# Patient Record
Sex: MALE | Race: BLACK OR AFRICAN AMERICAN | Employment: OTHER | URBAN - NONMETROPOLITAN AREA
[De-identification: names, ages, dates, MRNs, and addresses within clinical notes are randomized per-mention and may not be internally consistent; named-entity substitution may affect disease eponyms.]

---

## 2015-12-15 LAB — PROSTATE SPECIFIC ANTIGEN: 3.26 NG/ML

## 2017-04-17 LAB — PROSTATE SPECIFIC ANTIGEN: 2.73 NG/ML

## 2018-02-01 LAB
ALBUMIN SERPL-MCNC: 4.1 G/DL
ALP BLD-CCNC: 44 U/L
ALT SERPL-CCNC: 15 U/L
ANION GAP SERPL CALCULATED.3IONS-SCNC: 8 MMOL/L
AST SERPL-CCNC: 22 U/L
BILIRUB SERPL-MCNC: 0.8 MG/DL (ref 0.1–1.4)
BUN BLDV-MCNC: 30 MG/DL
CALCIUM SERPL-MCNC: 9 MG/DL
CHLORIDE BLD-SCNC: 112 MMOL/L
CHOLESTEROL, TOTAL: 134 MG/DL
CHOLESTEROL/HDL RATIO: 2.1
CO2: 23 MMOL/L
CREAT SERPL-MCNC: 1.53 MG/DL
GFR CALCULATED: NORMAL
GLUCOSE BLD-MCNC: 96 MG/DL
HDLC SERPL-MCNC: 62 MG/DL (ref 35–70)
LDL CHOLESTEROL CALCULATED: 88 MG/DL (ref 0–160)
POTASSIUM SERPL-SCNC: 4.2 MMOL/L
PROSTATE SPECIFIC ANTIGEN: 4.19 NG/ML
SODIUM BLD-SCNC: 143 MMOL/L
TOTAL PROTEIN: 6.4
TRIGL SERPL-MCNC: 56 MG/DL
VLDLC SERPL CALC-MCNC: 11 MG/DL

## 2018-06-20 ENCOUNTER — OFFICE VISIT (OUTPATIENT)
Dept: UROLOGY | Age: 83
End: 2018-06-20
Payer: MEDICARE

## 2018-06-20 VITALS
DIASTOLIC BLOOD PRESSURE: 62 MMHG | SYSTOLIC BLOOD PRESSURE: 132 MMHG | WEIGHT: 306.6 LBS | BODY MASS INDEX: 45.41 KG/M2 | HEIGHT: 69 IN

## 2018-06-20 DIAGNOSIS — R97.20 ELEVATED PSA: Primary | ICD-10-CM

## 2018-06-20 LAB
BILIRUBIN URINE: NEGATIVE
BLOOD URINE, POC: NEGATIVE
CHARACTER, URINE: CLEAR
COLOR, URINE: YELLOW
GLUCOSE URINE: NEGATIVE MG/DL
KETONES, URINE: NEGATIVE
LEUKOCYTE CLUMPS, URINE: ABNORMAL
NITRITE, URINE: NEGATIVE
PH, URINE: 5.5
PROTEIN, URINE: NEGATIVE MG/DL
SPECIFIC GRAVITY, URINE: 1.01 (ref 1–1.03)
UROBILINOGEN, URINE: 0.2 EU/DL

## 2018-06-20 PROCEDURE — 81003 URINALYSIS AUTO W/O SCOPE: CPT | Performed by: NURSE PRACTITIONER

## 2018-06-20 PROCEDURE — G8427 DOCREV CUR MEDS BY ELIG CLIN: HCPCS | Performed by: NURSE PRACTITIONER

## 2018-06-20 PROCEDURE — G8417 CALC BMI ABV UP PARAM F/U: HCPCS | Performed by: NURSE PRACTITIONER

## 2018-06-20 PROCEDURE — 1036F TOBACCO NON-USER: CPT | Performed by: NURSE PRACTITIONER

## 2018-06-20 PROCEDURE — 1123F ACP DISCUSS/DSCN MKR DOCD: CPT | Performed by: NURSE PRACTITIONER

## 2018-06-20 PROCEDURE — 4040F PNEUMOC VAC/ADMIN/RCVD: CPT | Performed by: NURSE PRACTITIONER

## 2018-06-20 PROCEDURE — 99203 OFFICE O/P NEW LOW 30 MIN: CPT | Performed by: NURSE PRACTITIONER

## 2018-06-20 RX ORDER — INSULIN GLARGINE 100 [IU]/ML
INJECTION, SOLUTION SUBCUTANEOUS NIGHTLY
COMMUNITY

## 2018-06-20 RX ORDER — POTASSIUM CHLORIDE 750 MG/1
10 CAPSULE, EXTENDED RELEASE ORAL DAILY
COMMUNITY

## 2018-06-20 RX ORDER — HYDRALAZINE HYDROCHLORIDE 25 MG/1
25 TABLET, FILM COATED ORAL 2 TIMES DAILY
COMMUNITY

## 2018-06-20 RX ORDER — TRAZODONE HYDROCHLORIDE 50 MG/1
50 TABLET ORAL NIGHTLY
COMMUNITY

## 2018-06-20 RX ORDER — ESOMEPRAZOLE MAGNESIUM 40 MG/1
40 FOR SUSPENSION ORAL DAILY
COMMUNITY
End: 2021-11-01

## 2018-06-20 RX ORDER — NIACIN 1000 MG
TABLET, EXTENDED RELEASE ORAL
COMMUNITY

## 2018-06-20 RX ORDER — NEBIVOLOL 10 MG/1
10 TABLET ORAL DAILY
COMMUNITY

## 2018-06-20 RX ORDER — PIOGLITAZONEHYDROCHLORIDE 45 MG/1
45 TABLET ORAL DAILY
COMMUNITY
End: 2021-11-01

## 2018-06-20 RX ORDER — LISINOPRIL 20 MG/1
20 TABLET ORAL 2 TIMES DAILY
COMMUNITY

## 2018-06-20 RX ORDER — SIMVASTATIN 40 MG
40 TABLET ORAL NIGHTLY
COMMUNITY

## 2018-06-20 RX ORDER — CHLORTHALIDONE 25 MG/1
25 TABLET ORAL DAILY
COMMUNITY

## 2018-08-15 LAB — PROSTATE SPECIFIC ANTIGEN: 4.88 NG/ML

## 2018-08-22 ENCOUNTER — OFFICE VISIT (OUTPATIENT)
Dept: UROLOGY | Age: 83
End: 2018-08-22
Payer: MEDICARE

## 2018-08-22 VITALS
SYSTOLIC BLOOD PRESSURE: 138 MMHG | DIASTOLIC BLOOD PRESSURE: 68 MMHG | WEIGHT: 302 LBS | BODY MASS INDEX: 45.77 KG/M2 | HEIGHT: 68 IN

## 2018-08-22 DIAGNOSIS — R97.20 ELEVATED PSA: Primary | ICD-10-CM

## 2018-08-22 LAB
BILIRUBIN URINE: NEGATIVE
BLOOD URINE, POC: NEGATIVE
CHARACTER, URINE: CLEAR
COLOR, URINE: YELLOW
GLUCOSE URINE: NEGATIVE MG/DL
KETONES, URINE: NEGATIVE
LEUKOCYTE CLUMPS, URINE: ABNORMAL
NITRITE, URINE: NEGATIVE
PH, URINE: 5.5
PROTEIN, URINE: NEGATIVE MG/DL
SPECIFIC GRAVITY, URINE: 1.02 (ref 1–1.03)
UROBILINOGEN, URINE: 0.2 EU/DL

## 2018-08-22 PROCEDURE — 1101F PT FALLS ASSESS-DOCD LE1/YR: CPT | Performed by: NURSE PRACTITIONER

## 2018-08-22 PROCEDURE — G8427 DOCREV CUR MEDS BY ELIG CLIN: HCPCS | Performed by: NURSE PRACTITIONER

## 2018-08-22 PROCEDURE — 1036F TOBACCO NON-USER: CPT | Performed by: NURSE PRACTITIONER

## 2018-08-22 PROCEDURE — 81003 URINALYSIS AUTO W/O SCOPE: CPT | Performed by: NURSE PRACTITIONER

## 2018-08-22 PROCEDURE — 99214 OFFICE O/P EST MOD 30 MIN: CPT | Performed by: NURSE PRACTITIONER

## 2018-08-22 PROCEDURE — 1123F ACP DISCUSS/DSCN MKR DOCD: CPT | Performed by: NURSE PRACTITIONER

## 2018-08-22 PROCEDURE — G8417 CALC BMI ABV UP PARAM F/U: HCPCS | Performed by: NURSE PRACTITIONER

## 2018-08-22 PROCEDURE — 4040F PNEUMOC VAC/ADMIN/RCVD: CPT | Performed by: NURSE PRACTITIONER

## 2018-08-30 ENCOUNTER — TELEPHONE (OUTPATIENT)
Dept: UROLOGY | Age: 83
End: 2018-08-30

## 2018-08-30 ENCOUNTER — HOSPITAL ENCOUNTER (OUTPATIENT)
Dept: MRI IMAGING | Age: 83
Discharge: HOME | End: 2018-08-30
Payer: MEDICARE

## 2018-08-30 DIAGNOSIS — R97.20 ELEVATED PSA: ICD-10-CM

## 2018-08-30 LAB — POC CREATININE WHOLE BLOOD: 1.4 MG/DL (ref 0.5–1.2)

## 2018-08-30 PROCEDURE — A9579 GAD-BASE MR CONTRAST NOS,1ML: HCPCS | Performed by: NURSE PRACTITIONER

## 2018-08-30 PROCEDURE — 6360000004 HC RX CONTRAST MEDICATION: Performed by: NURSE PRACTITIONER

## 2018-08-30 PROCEDURE — 82565 ASSAY OF CREATININE: CPT

## 2018-08-30 PROCEDURE — 76377 3D RENDER W/INTRP POSTPROCES: CPT

## 2018-08-30 RX ADMIN — GADOTERIDOL 20 ML: 279.3 INJECTION, SOLUTION INTRAVENOUS at 10:28

## 2018-08-30 NOTE — TELEPHONE ENCOUNTER
Nadir Islas agreed tot he [place] prostate biopsy in office with Dr Martinez Navarro on 10/8/2018 2:45pm.  F/U for results 10/22/18 11:45am. Discussed prep. Advised instructions will be mailed. Please send the antibiotics to [facility] in [place].

## 2018-08-30 NOTE — TELEPHONE ENCOUNTER
Please let patient know that MRI showed a few areas which were consistent with PI-RADS 3. We typically biopsy any score 3 lesions or above. I would suggest biopsy of these lesions. If he agrees I will send antibiotics. Multiple heterogeneous and well defined nodules are noted throughout the transitional zone. These are most consistent with nodules of benign prosthetic hypertrophy (PI-RADS 2). A focal area of hypointense T2 signal is also noted within the right    posterior apex of the gland which demonstrates corresponding subtle restricted diffusion. A more ill-defined area of hypointense T2 signal is also noted at the posterior right base of the gland. This also demonstrates subtle restricted diffusion. These    areas are most consistent with PI-RADS 3. Overall, this is considered PI-RADS 3: Intermediate (the presence of clinically significant cancer is equivocal).

## 2018-08-30 NOTE — TELEPHONE ENCOUNTER
Patient has been notified of MRI results, per Alicia Cintron CNP. Patient is willing to have a prostate biopsy. Schedulers, will you please call the patient and get the biopsy scheduled. Thank you.

## 2018-09-05 ENCOUNTER — TELEPHONE (OUTPATIENT)
Dept: UROLOGY | Age: 83
End: 2018-09-05

## 2018-09-05 RX ORDER — GENTAMICIN SULFATE 40 MG/ML
80 INJECTION, SOLUTION INTRAMUSCULAR; INTRAVENOUS ONCE
Qty: 2 ML | Refills: 0 | Status: SHIPPED | OUTPATIENT
Start: 2018-09-05 | End: 2018-09-05

## 2018-09-05 RX ORDER — CIPROFLOXACIN 500 MG/1
500 TABLET, FILM COATED ORAL 2 TIMES DAILY
Qty: 6 TABLET | Refills: 0 | Status: SHIPPED | OUTPATIENT
Start: 2018-09-05 | End: 2018-09-08

## 2018-09-05 NOTE — TELEPHONE ENCOUNTER
Please send the Cipro & Gentamycin to [facility] on [address]. Cee Atwood is scheduled for UroNav on 10/8/2018.     Juan Diego Renteria

## 2018-10-08 ENCOUNTER — PROCEDURE VISIT (OUTPATIENT)
Dept: UROLOGY | Age: 83
End: 2018-10-08
Payer: MEDICARE

## 2018-10-08 VITALS
WEIGHT: 298 LBS | BODY MASS INDEX: 45.16 KG/M2 | HEIGHT: 68 IN | DIASTOLIC BLOOD PRESSURE: 62 MMHG | SYSTOLIC BLOOD PRESSURE: 138 MMHG

## 2018-10-08 DIAGNOSIS — R97.20 ELEVATED PSA: Primary | ICD-10-CM

## 2018-10-08 PROCEDURE — 99999 PR SONO GUIDE NEEDLE BIOPSY: CPT | Performed by: UROLOGY

## 2018-10-08 PROCEDURE — 76872 US TRANSRECTAL: CPT | Performed by: UROLOGY

## 2018-10-08 PROCEDURE — 55700 PR BIOPSY OF PROSTATE,NEEDLE/PUNCH: CPT | Performed by: UROLOGY

## 2018-10-08 PROCEDURE — 99999 PR OFFICE/OUTPT VISIT,PROCEDURE ONLY: CPT | Performed by: UROLOGY

## 2018-10-08 PROCEDURE — 96372 THER/PROPH/DIAG INJ SC/IM: CPT | Performed by: UROLOGY

## 2018-10-08 RX ORDER — GENTAMICIN SULFATE 40 MG/ML
80 INJECTION, SOLUTION INTRAMUSCULAR; INTRAVENOUS ONCE
Status: COMPLETED | OUTPATIENT
Start: 2018-10-08 | End: 2018-10-08

## 2018-10-08 RX ADMIN — GENTAMICIN SULFATE 80 MG: 40 INJECTION, SOLUTION INTRAMUSCULAR; INTRAVENOUS at 16:05

## 2018-10-08 NOTE — PROGRESS NOTES
Following Dr. Trimble [place] of care. Gentamicin 80MG/2ML GIVEN I.M Right UOQ HIP  Lot Number: [id number]  Expiration Date: 02/01/2019  [facility] #: [id number]    Patient supplied their own medications Yes    Procedure: Trus/Biopsy  Pt name and birth date verified Yes  Patient agrees  Is taking biopsies of the prostate Yes  Patient took Enema 2 hours prior to procedure Yes  Patient took 2 pill(s) of Cipro day before procedure, day of, and will the day after Yes  Has patient eaten today?  Yes   Patient stopped all blood thinners prior to surgery N/A      *ADDENDUM: MAR is incorrect, patient supplied own medication

## 2018-10-08 NOTE — PROGRESS NOTES
Mr. Ramonita Prieto was seen in follow up for his prostate biopsy. The biopsy was indicated and is being performed for Elevated PSA. The biopsy is being done with MRI / Ultrasound fusion using the UroNav system. The biopsy was done without difficulty or complication. TRUS prostate biopsy note:  After obtaining informed consent, the rectal ultrasound probe was passed per rectum and the prostate visualized. A local block was performed by instilling 2% lidocaine at the base. Measurements were taken and the prostate measured 6.67 x 5.29 x 5.52 mm for a total volume of 102.17 cc. At this point, prostate biopsy was performed. Using Skylar Ayers, the ultrasound and MRI images were fused and then biopsies of the lesions identified by the radiologist were taken. Three cores were taken from each of the 2 lesions seen on MRI. Two cores were then  taken at the base, the mid-portion, and the apex of the gland on either side for a total of 12 cores. The rectal probe was removed and there was minimal bleeding. Mr. Ramonita Prieto tolerated the procedure well and there were no complications. Prostate size: 102.17 cc  Prostatic calcifications: no   Hypoechoic areas: no   Cores taken: 6 + 3 cores each from 2 lesions making 12 total cores  Complications: none      Assessment:      Prostate biopsy performed without difficulty. This was done with UroNav MRI fused guidance. Plan:        I will see Jaylan Moore back to discuss the pathology in 1-2 weeks. Further recommendations will be based on these results.

## 2018-10-22 ENCOUNTER — OFFICE VISIT (OUTPATIENT)
Dept: UROLOGY | Age: 83
End: 2018-10-22
Payer: MEDICARE

## 2018-10-22 VITALS — HEIGHT: 68 IN | WEIGHT: 298 LBS | BODY MASS INDEX: 45.16 KG/M2

## 2018-10-22 DIAGNOSIS — R97.20 ELEVATED PSA: Primary | ICD-10-CM

## 2018-10-22 DIAGNOSIS — R31.9 URINARY TRACT INFECTION WITH HEMATURIA, SITE UNSPECIFIED: ICD-10-CM

## 2018-10-22 DIAGNOSIS — N39.0 URINARY TRACT INFECTION WITH HEMATURIA, SITE UNSPECIFIED: ICD-10-CM

## 2018-10-22 LAB
BILIRUBIN URINE: NEGATIVE
BLOOD URINE, POC: NEGATIVE
CHARACTER, URINE: CLEAR
COLOR, URINE: YELLOW
GLUCOSE URINE: NEGATIVE MG/DL
KETONES, URINE: NEGATIVE
LEUKOCYTE CLUMPS, URINE: ABNORMAL
NITRITE, URINE: NEGATIVE
PH, URINE: 6
PROTEIN, URINE: NEGATIVE MG/DL
SPECIFIC GRAVITY, URINE: 1.01 (ref 1–1.03)
UROBILINOGEN, URINE: 4 EU/DL

## 2018-10-22 PROCEDURE — G8417 CALC BMI ABV UP PARAM F/U: HCPCS | Performed by: UROLOGY

## 2018-10-22 PROCEDURE — 4040F PNEUMOC VAC/ADMIN/RCVD: CPT | Performed by: UROLOGY

## 2018-10-22 PROCEDURE — 99214 OFFICE O/P EST MOD 30 MIN: CPT | Performed by: UROLOGY

## 2018-10-22 PROCEDURE — 1123F ACP DISCUSS/DSCN MKR DOCD: CPT | Performed by: UROLOGY

## 2018-10-22 PROCEDURE — 1036F TOBACCO NON-USER: CPT | Performed by: UROLOGY

## 2018-10-22 PROCEDURE — 1101F PT FALLS ASSESS-DOCD LE1/YR: CPT | Performed by: UROLOGY

## 2018-10-22 PROCEDURE — G8427 DOCREV CUR MEDS BY ELIG CLIN: HCPCS | Performed by: UROLOGY

## 2018-10-22 PROCEDURE — G8484 FLU IMMUNIZE NO ADMIN: HCPCS | Performed by: UROLOGY

## 2018-10-22 ASSESSMENT — ENCOUNTER SYMPTOMS
SHORTNESS OF BREATH: 0
CONSTIPATION: 0
CHEST TIGHTNESS: 0
DIARRHEA: 0

## 2018-10-23 ENCOUNTER — TELEPHONE (OUTPATIENT)
Dept: UROLOGY | Age: 83
End: 2018-10-23

## 2019-01-28 LAB — PROSTATE SPECIFIC ANTIGEN: 3.8 NG/ML

## 2019-04-22 LAB — PROSTATE SPECIFIC ANTIGEN: 4.86 NG/ML

## 2019-04-23 ENCOUNTER — TELEPHONE (OUTPATIENT)
Dept: UROLOGY | Age: 84
End: 2019-04-23

## 2019-04-23 NOTE — TELEPHONE ENCOUNTER
The patient was advised of the message from Watts CNP. PSA is 4.86. Continue to monitor every 3 months. He voiced understanding.

## 2019-07-15 LAB — PROSTATE SPECIFIC ANTIGEN: 3.96 NG/ML

## 2019-07-16 ENCOUNTER — TELEPHONE (OUTPATIENT)
Dept: UROLOGY | Age: 84
End: 2019-07-16

## 2019-07-16 NOTE — TELEPHONE ENCOUNTER
The patient was advised of the message from [facility]. PSA is down to 3.96. Continue to check every 3 months. He voiced understanding.

## 2019-09-24 DIAGNOSIS — R97.20 ELEVATED PSA: Primary | ICD-10-CM

## 2019-09-27 ENCOUNTER — TELEPHONE (OUTPATIENT)
Dept: UROLOGY | Age: 84
End: 2019-09-27

## 2019-10-16 LAB — PROSTATE SPECIFIC ANTIGEN: 4.76 NG/ML

## 2019-10-22 ENCOUNTER — OFFICE VISIT (OUTPATIENT)
Dept: UROLOGY | Age: 84
End: 2019-10-22
Payer: MEDICARE

## 2019-10-22 VITALS
SYSTOLIC BLOOD PRESSURE: 132 MMHG | DIASTOLIC BLOOD PRESSURE: 74 MMHG | WEIGHT: 299.4 LBS | HEIGHT: 68 IN | BODY MASS INDEX: 45.38 KG/M2

## 2019-10-22 DIAGNOSIS — R97.20 ELEVATED PSA: Primary | ICD-10-CM

## 2019-10-22 LAB
BILIRUBIN URINE: NEGATIVE
BLOOD URINE, POC: NEGATIVE
CHARACTER, URINE: CLEAR
COLOR, URINE: YELLOW
GLUCOSE URINE: NEGATIVE MG/DL
KETONES, URINE: ABNORMAL
LEUKOCYTE CLUMPS, URINE: ABNORMAL
NITRITE, URINE: NEGATIVE
PH, URINE: 5.5 (ref 5–9)
PROTEIN, URINE: ABNORMAL MG/DL
SPECIFIC GRAVITY, URINE: 1.02 (ref 1–1.03)
UROBILINOGEN, URINE: 1 EU/DL (ref 0–1)

## 2019-10-22 PROCEDURE — 1036F TOBACCO NON-USER: CPT | Performed by: NURSE PRACTITIONER

## 2019-10-22 PROCEDURE — 1123F ACP DISCUSS/DSCN MKR DOCD: CPT | Performed by: NURSE PRACTITIONER

## 2019-10-22 PROCEDURE — 99213 OFFICE O/P EST LOW 20 MIN: CPT | Performed by: NURSE PRACTITIONER

## 2019-10-22 PROCEDURE — 81003 URINALYSIS AUTO W/O SCOPE: CPT | Performed by: NURSE PRACTITIONER

## 2019-10-22 PROCEDURE — G8484 FLU IMMUNIZE NO ADMIN: HCPCS | Performed by: NURSE PRACTITIONER

## 2019-10-22 PROCEDURE — 4040F PNEUMOC VAC/ADMIN/RCVD: CPT | Performed by: NURSE PRACTITIONER

## 2019-10-22 PROCEDURE — G8427 DOCREV CUR MEDS BY ELIG CLIN: HCPCS | Performed by: NURSE PRACTITIONER

## 2019-10-22 PROCEDURE — G8417 CALC BMI ABV UP PARAM F/U: HCPCS | Performed by: NURSE PRACTITIONER

## 2020-10-21 LAB — PROSTATE SPECIFIC ANTIGEN: 4.15 NG/ML

## 2020-10-28 ENCOUNTER — OFFICE VISIT (OUTPATIENT)
Dept: UROLOGY | Age: 85
End: 2020-10-28
Payer: MEDICARE

## 2020-10-28 VITALS — BODY MASS INDEX: 44.73 KG/M2 | WEIGHT: 285 LBS | TEMPERATURE: 97.3 F | HEIGHT: 67 IN

## 2020-10-28 LAB
BILIRUBIN URINE: NEGATIVE
BLOOD URINE, POC: NEGATIVE
CHARACTER, URINE: CLEAR
COLOR, URINE: YELLOW
GLUCOSE URINE: NEGATIVE MG/DL
KETONES, URINE: NEGATIVE
LEUKOCYTE CLUMPS, URINE: ABNORMAL
NITRITE, URINE: NEGATIVE
PH, URINE: 6 (ref 5–9)
PROTEIN, URINE: NEGATIVE MG/DL
SPECIFIC GRAVITY, URINE: >= 1.03 (ref 1–1.03)
UROBILINOGEN, URINE: 0.2 EU/DL (ref 0–1)

## 2020-10-28 PROCEDURE — 1123F ACP DISCUSS/DSCN MKR DOCD: CPT | Performed by: NURSE PRACTITIONER

## 2020-10-28 PROCEDURE — G8484 FLU IMMUNIZE NO ADMIN: HCPCS | Performed by: NURSE PRACTITIONER

## 2020-10-28 PROCEDURE — G8427 DOCREV CUR MEDS BY ELIG CLIN: HCPCS | Performed by: NURSE PRACTITIONER

## 2020-10-28 PROCEDURE — 81003 URINALYSIS AUTO W/O SCOPE: CPT | Performed by: NURSE PRACTITIONER

## 2020-10-28 PROCEDURE — G8417 CALC BMI ABV UP PARAM F/U: HCPCS | Performed by: NURSE PRACTITIONER

## 2020-10-28 PROCEDURE — 1036F TOBACCO NON-USER: CPT | Performed by: NURSE PRACTITIONER

## 2020-10-28 PROCEDURE — 99213 OFFICE O/P EST LOW 20 MIN: CPT | Performed by: NURSE PRACTITIONER

## 2020-10-28 PROCEDURE — 4040F PNEUMOC VAC/ADMIN/RCVD: CPT | Performed by: NURSE PRACTITIONER

## 2020-10-28 RX ORDER — TAMSULOSIN HYDROCHLORIDE 0.4 MG/1
CAPSULE ORAL
COMMUNITY

## 2020-10-28 NOTE — PROGRESS NOTES
[facility]  [facility]  [address] [address] [address]  Dept: [phone]  Dept Fax: [phone] : [phone]      Visit Date: 10/28/2020    HPI:     Narciso Luis presents for follow-up of prostate cancer. Denies any new urinary symptoms. No urgency, frequency or incomplete bladder emptying. Nocturia x1. Most recent PSA 4.15. No family history of prostate cancer. Previous records: The PSA was 3.26 in December 2016, then 2.73 in April 2017 and then up to 4.19 in February 2018. He reports that his PSA went up like this several years ago and he was referred to Dr. Idalmis Bolaños and it came right back down. He had MRI of pelvis completed for elevated PSA and showed PIRADS 3 lesion. Madi Terry biopsy was completed 10-8-18 by Dr. John Bee, PSA 4.88 at that time. 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 Tissue was sent for Bag of Ice and showed patient was low risk. Past Medical History:   Diagnosis Date    [facility] mellitus [facility] [address]     Hypertension     Prostate cancer ([facility] [address]       History reviewed. No pertinent surgical history. No family history on file.     Social History     Tobacco Use    Smoking status: Former Smoker    Smokeless tobacco: Never Used   Substance Use Topics    Alcohol use: Not on file      Current Outpatient Medications   Medication Sig Dispense Refill    tamsulosin (FLOMAX) 0.4 MG capsule 1 capsule      esomeprazole Magnesium (NEXIUM) 40 MG PACK Take 40 mg by mouth daily      Multiple Vitamins-Minerals (CENTRUM SILVER 50+MEN PO) Take by mouth      chlorthalidone (HYGROTON) 25 MG tablet Take 25 mg by mouth daily      simvastatin (ZOCOR) 40 MG tablet Take 40 mg by mouth nightly      Niacin ER 1000 MG TBCR Take by mouth      hydrALAZINE (APRESOLINE) 25 MG tablet Take 25 mg by mouth 2 times daily      nebivolol (BYSTOLIC) 10 MG tablet Take 10 mg by mouth daily      potassium chloride (MICRO-K) 10 MEQ extended release capsule Take 10 mEq by mouth daily      pioglitazone (ACTOS) 45 MG tablet Take 45 mg by mouth daily      lisinopril (PRINIVIL;ZESTRIL) 20 MG tablet Take 20 mg by mouth 2 times daily      traZODone (DESYREL) 50 MG tablet Take 50 mg by mouth nightly      insulin glargine (LANTUS) 100 UNIT/ML injection vial Inject into the skin nightly      vitamin D (CHOLECALCIFEROL) 1000 UNIT TABS tablet Take 1,000 Units by mouth daily       No current facility-administered medications for this visit. No Known Allergies    ROS:  Constitutional: Negative for chills, fatigue, fever, or weight loss. Eyes: Denies reported visual changes. ENT: Denies headache, difficulty swallowing, nose bleeds, ringing in ears, or earaches. Cardiovascular: Negative for chest pain, palpitations, tachycardia or edema. Respiratory: Denies cough or SOB. GI:The patient denies abdominal or flank pain, anorexia, nausea or vomiting. : See HPI  Musculoskeletal: Patient denies low back pain or painful or reduced ROM of the back or extremities. Neurological: The patient denies any symptoms of neurological impairment or TIA's; no history of stroke. Lymphatic: Denies swollen glands in neck, axillary or inguinal areas. Psychiatric: Denies anxiety or depression. Skin: Denies rash or lesions. The remainder of the complete ROS is negative    PHYSICAL EXAM:  VITALS:  Temp 97.3 °F (36.3 °C)   Ht 5' 7\" (1.702 m)   Wt 285 lb (129.3 kg)   BMI 44.64 kg/m² . Constitutional:    Alert and oriented times 3, no acute distress and cooperative to examination with appropriate mood and affect. HEENT:   Head:         Normocephalic and atraumatic. Mouth/Throat:          Mucous membranes are normal.   Eyes:         EOM are normal. No scleral icterus. Nose:    The external appearance of the nose is normal  Ears:      The ears appear normal to external inspection   Neck:         Supple, symmetrical, trachea midline, no adenopathy, thyroid symmetric, not enlarged and no tenderness. Cardiovascular:        Normal rate, regular rhythm, S1 S2 heart sounds. Pulmonary/Chest:       Normal respiratory rate and rhthym. No use of accessory muscles. Abdominal:          Soft. No tenderness. Active bowel sounds. : Rectal: enlarged prostate, normal tone, no masses, nodules, induration palpated, smooth and freely movable  Musculoskeletal:    Normal range of motion. he exhibits no edema or tenderness of lower extremities. Extremities:    No cyanosis, clubbing, or edema present. Neurological:    Alert and oriented.     DATA:  CBC:   Lab Results   Component Value Date    WBC 5.0 06/15/2020    RBC 4.03 06/15/2020    HGB 11.4 06/15/2020    HCT 35.3 06/15/2020    MCV 87.7 06/15/2020    MCH 28.4 06/15/2020    MCHC 32.4 06/15/2020    RDW 14.5 06/15/2020     06/15/2020     BMP:    Lab Results   Component Value Date     06/15/2020    K 4.1 06/15/2020     06/15/2020    CO2 24 06/15/2020    BUN 27 06/15/2020    CREATININE 1.28 06/15/2020    CALCIUM 8.90 06/15/2020    GLUCOSE 99 06/15/2020     BUN/Creatinine:    Lab Results   Component Value Date    BUN 27 06/15/2020    CREATININE 1.28 06/15/2020     Magnesium:    Lab Results   Component Value Date    MG 1.5 08/08/2019     Phosphorus:  No results found for: PHOS  PT/INR:  No results found for: PROTIME, INR  U/A:        Results for POC orders placed in visit on 10/28/20   POCT Urinalysis No Micro (Auto)   Result Value Ref Range    Glucose, Ur Negative NEGATIVE mg/dl    Bilirubin Urine Negative     Ketones, Urine Negative NEGATIVE    Specific Gravity, Urine >= 1.030 1.002 - 1.030    Blood, UA POC Negative NEGATIVE    pH, Urine 6.00 5.0 - 9.0    Protein, Urine Negative NEGATIVE mg/dl    Urobilinogen, Urine 0.20 0.0 - 1.0 eu/dl    Nitrite, Urine Negative NEGATIVE    Leukocyte Clumps, Urine Small (A) NEGATIVE    Color, Urine Yellow YELLOW-STRAW    Character, Urine Clear CLR-SL.CLOUD          FINAL DIAGNOSIS:  A, C-H. Prostate, right apex, right mid, left mid, right base, left  base, lesion #1, and lesion #2, needle biopsies:   Benign prostatic tissue. B. Prostate, left apex, needle biopsy:      Adenocarcinoma of the prostate, [place] score 3+3 = 6, in one of 2      cores, 2/16 mm, 13%. Assessment & Plan:        Prostate Cancer- Chaz 3+3      PSA at time of biopsy 4.88. Talia Corporal biopsy done in 2018 for PIRADS 3 lesion, showed Chaz 6 prostate cancer in 1 of 2 cores. Patient has been on active surveillance. Decipher showed low risk. Most recent PSA 4.15. We discussed repeating biopsy or MRI. Pt reluctant to have another biopsy at this time. Would like to repeat PSA in 6 months first.    Recheck PSA in 6 months. Fu in office.      Electronically signed by BRIEN Gonzales CNP on 10/28/2020 at 2:18 PM

## 2021-04-21 ENCOUNTER — TELEPHONE (OUTPATIENT)
Dept: UROLOGY | Age: 86
End: 2021-04-21

## 2021-04-21 LAB — PROSTATE SPECIFIC ANTIGEN: 3.56 NG/ML

## 2021-04-27 NOTE — PROGRESS NOTES
[facility]  [facility]  [address] [address] [address]  Dept: [phone]  Dept Fax: [phone]: [phone]      Visit Date: 4/28/2021    HPI:     Sukhdeep Neff presents for follow-up of prostate cancer. In 2018, he had MRI of pelvis completed for elevated PSA and showed PIRADS 3 lesion. Ruperto Watkins biopsy was completed 10-8-18 by Dr. Aimee Rodriguez, PSA 4.88 at that time. 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 Tissue was sent for Decipher and showed patient was low risk. Denies any new urinary symptoms. No urgency, frequency or incomplete bladder emptying. Nocturia x1. Most recent PSA 3.56, lowest its been since 2017. Pt is on Flomax. No family history of prostate cancer. Past Medical History:   Diagnosis Date    Diabetes mellitus ([facility] [address]     Hypertension     Prostate cancer ([facility] [address]       History reviewed. No pertinent surgical history. History reviewed. No pertinent family history.     Social History     Tobacco Use    Smoking status: Former Smoker    Smokeless tobacco: Never Used   Substance Use Topics    Alcohol use: Not on file      Current Outpatient Medications   Medication Sig Dispense Refill    tamsulosin (FLOMAX) 0.4 MG capsule 1 capsule      esomeprazole Magnesium (NEXIUM) 40 MG PACK Take 40 mg by mouth daily      Multiple Vitamins-Minerals (CENTRUM SILVER 50+MEN PO) Take by mouth      chlorthalidone (HYGROTON) 25 MG tablet Take 25 mg by mouth daily      simvastatin (ZOCOR) 40 MG tablet Take 40 mg by mouth nightly      Niacin ER 1000 MG TBCR Take by mouth      hydrALAZINE (APRESOLINE) 25 MG tablet Take 25 mg by mouth 2 times daily      nebivolol (BYSTOLIC) 10 MG tablet Take 10 mg by mouth daily      potassium chloride (MICRO-K) 10 MEQ extended release capsule Take 10 mEq by mouth daily      pioglitazone (ACTOS) 45 MG tablet Take 45 mg by mouth daily      lisinopril (PRINIVIL;ZESTRIL) 20 MG tablet Take 20 mg by mouth 2 times daily      traZODone (DESYREL) 50 MG tablet Take 50 mg by mouth nightly      insulin glargine (LANTUS) 100 UNIT/ML injection vial Inject into the skin nightly      vitamin D (CHOLECALCIFEROL) 1000 UNIT TABS tablet Take 1,000 Units by mouth daily       No current facility-administered medications for this visit. No Known Allergies    ROS:  Constitutional: Negative for chills, fatigue, fever, or weight loss. Eyes: Denies reported visual changes. ENT: Denies headache, difficulty swallowing, nose bleeds, ringing in ears, or earaches. Cardiovascular: Negative for chest pain, palpitations, tachycardia or edema. Respiratory: Denies cough or SOB. GI:The patient denies abdominal or flank pain, anorexia, nausea or vomiting. : See HPI  Musculoskeletal: Patient denies low back pain or painful or reduced ROM of the back or extremities. Neurological: The patient denies any symptoms of neurological impairment or TIA's; no history of stroke. Lymphatic: Denies swollen glands in neck, axillary or inguinal areas. Psychiatric: Denies anxiety or depression. Skin: Denies rash or lesions. The remainder of the complete ROS is negative    PHYSICAL EXAM:  VITALS:  BP (!) 150/60   Ht 5' 8\" (1.727 m)   Wt 285 lb (129.3 kg)   BMI 43.33 kg/m² . Constitutional:    Alert and oriented times 3, no acute distress and cooperative to examination with appropriate mood and affect. HEENT:   Head:         Normocephalic and atraumatic. Mouth/Throat:          Mucous membranes are normal.   Eyes:         EOM are normal. No scleral icterus. Nose:    The external appearance of the nose is normal  Ears: The ears appear normal to external inspection   Neck:         Supple, symmetrical, trachea midline, no adenopathy, thyroid symmetric, not enlarged and no tenderness. Cardiovascular:        Normal rate, regular rhythm, S1 S2 heart sounds. Pulmonary/Chest:       Normal respiratory rate and rhthym. No use of accessory muscles. Abdominal:          Soft. No tenderness. Active bowel sounds. : Rectal: enlarged prostate, normal tone, no masses, nodules, induration palpated, smooth and freely movable  Musculoskeletal:    Normal range of motion. he exhibits no edema or tenderness of lower extremities. Extremities:    No cyanosis, clubbing, or edema present. Neurological:    Alert and oriented. DATA:  CBC:   Lab Results   Component Value Date    WBC 4.1 03/12/2021    RBC 3.86 03/12/2021    HGB 11.2 03/12/2021    HCT 34.1 03/12/2021    MCV 88.2 03/12/2021    MCH 28.9 03/12/2021    MCHC 32.8 03/12/2021    RDW 14.1 03/12/2021     03/12/2021     BMP:    Lab Results   Component Value Date     03/12/2021    K 3.7 03/12/2021     03/12/2021    CO2 25 03/12/2021    BUN 20 03/12/2021    CREATININE 1.20 03/12/2021    CALCIUM 8.60 03/12/2021    GLUCOSE 80 03/12/2021     BUN/Creatinine:    Lab Results   Component Value Date    BUN 20 03/12/2021    CREATININE 1.20 03/12/2021     Magnesium:    Lab Results   Component Value Date    MG 1.5 08/08/2019     Phosphorus:  No results found for: PHOS  PT/INR:  No results found for: PROTIME, INR  U/A:        Results for POC orders placed in visit on 04/28/21   POCT Urinalysis No Micro (Auto)   Result Value Ref Range    Glucose, Ur Negative NEGATIVE mg/dl    Bilirubin Urine Negative     Ketones, Urine Trace (A) NEGATIVE    Specific Gravity, Urine 1.025 1.002 - 1.030    Blood, UA POC Negative NEGATIVE    pH, Urine 6.50 5.0 - 9.0    Protein, Urine Negative NEGATIVE mg/dl    Urobilinogen, Urine 1.00 0.0 - 1.0 eu/dl    Nitrite, Urine Negative NEGATIVE    Leukocyte Clumps, Urine Trace (A) NEGATIVE    Color, Urine Yellow YELLOW-STRAW    Character, Urine Clear CLR-SL.CLOUD          FINAL DIAGNOSIS:  A, C-H.  Prostate, right apex, right mid, left mid, right base, left  base, lesion #1, and lesion #2, needle

## 2021-04-28 ENCOUNTER — OFFICE VISIT (OUTPATIENT)
Dept: UROLOGY | Age: 86
End: 2021-04-28
Payer: MEDICARE

## 2021-04-28 VITALS
SYSTOLIC BLOOD PRESSURE: 150 MMHG | BODY MASS INDEX: 43.19 KG/M2 | DIASTOLIC BLOOD PRESSURE: 60 MMHG | HEIGHT: 68 IN | WEIGHT: 285 LBS

## 2021-04-28 DIAGNOSIS — C61 PROSTATE CANCER (HCC): Primary | ICD-10-CM

## 2021-04-28 LAB
BILIRUBIN URINE: NEGATIVE
BLOOD URINE, POC: NEGATIVE
CHARACTER, URINE: CLEAR
COLOR, URINE: YELLOW
GLUCOSE URINE: NEGATIVE MG/DL
KETONES, URINE: ABNORMAL
LEUKOCYTE CLUMPS, URINE: ABNORMAL
NITRITE, URINE: NEGATIVE
PH, URINE: 6.5 (ref 5–9)
PROTEIN, URINE: NEGATIVE MG/DL
SPECIFIC GRAVITY, URINE: 1.02 (ref 1–1.03)
UROBILINOGEN, URINE: 1 EU/DL (ref 0–1)

## 2021-04-28 PROCEDURE — 1123F ACP DISCUSS/DSCN MKR DOCD: CPT | Performed by: NURSE PRACTITIONER

## 2021-04-28 PROCEDURE — G8427 DOCREV CUR MEDS BY ELIG CLIN: HCPCS | Performed by: NURSE PRACTITIONER

## 2021-04-28 PROCEDURE — 4040F PNEUMOC VAC/ADMIN/RCVD: CPT | Performed by: NURSE PRACTITIONER

## 2021-04-28 PROCEDURE — 99213 OFFICE O/P EST LOW 20 MIN: CPT | Performed by: NURSE PRACTITIONER

## 2021-04-28 PROCEDURE — 1036F TOBACCO NON-USER: CPT | Performed by: NURSE PRACTITIONER

## 2021-04-28 PROCEDURE — 81003 URINALYSIS AUTO W/O SCOPE: CPT | Performed by: NURSE PRACTITIONER

## 2021-04-28 PROCEDURE — G8417 CALC BMI ABV UP PARAM F/U: HCPCS | Performed by: NURSE PRACTITIONER

## 2021-04-28 NOTE — PATIENT INSTRUCTIONS
You may receive a survey regarding the care you received during your visit. Your input is valuable to us. We encourage you to complete and return your survey. We hope you will choose us in the future for your healthcare needs. PSA IN 3 MONTHS AND 6 MONTHS .

## 2021-07-21 LAB — PROSTATE SPECIFIC ANTIGEN: 4.24 NG/ML

## 2021-07-22 ENCOUNTER — TELEPHONE (OUTPATIENT)
Dept: UROLOGY | Age: 86
End: 2021-07-22

## 2021-07-22 NOTE — TELEPHONE ENCOUNTER
I called and spoke with the patient and notified him that his Psa stable at 4.24. patient voiced understanding.

## 2021-10-25 LAB — PROSTATE SPECIFIC ANTIGEN: 6.38 NG/ML

## 2021-10-28 NOTE — PROGRESS NOTES
[facility]  [facility]  [address] [address] [address]  Dept: [phone]  Dept Fax: [phone]: [phone]      Visit Date: 11/1/2021    HPI:     Angel Rosado presents for follow-up of prostate cancer. In 2018, he had MRI of pelvis completed for elevated PSA and showed PIRADS 3 lesion. Bia Contras biopsy was completed 10-8-18 by Dr. Adela Espinal, PSA 4.88 at that time. 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 Tissue was sent for Decipher and showed patient was low risk. Denies any new urinary symptoms. No urgency, frequency or incomplete bladder emptying. Nocturia x1. No new  Bone pain or abdominal pain    Most recent PSA 6.38. Up from July. Pt is on Flomax. No family history of prostate cancer. Past Medical History:   Diagnosis Date    Diabetes mellitus ([facility] [address]     Hypertension     Prostate cancer ([facility] [address]       History reviewed. No pertinent surgical history. History reviewed. No pertinent family history.     Social History     Tobacco Use    Smoking status: Former Smoker    Smokeless tobacco: Never Used   Substance Use Topics    Alcohol use: Not on file      Current Outpatient Medications   Medication Sig Dispense Refill    tamsulosin (FLOMAX) 0.4 MG capsule 1 capsule      Multiple Vitamins-Minerals (CENTRUM SILVER 50+MEN PO) Take by mouth      chlorthalidone (HYGROTON) 25 MG tablet Take 25 mg by mouth daily      simvastatin (ZOCOR) 40 MG tablet Take 40 mg by mouth nightly      Niacin ER 1000 MG TBCR Take by mouth      hydrALAZINE (APRESOLINE) 25 MG tablet Take 25 mg by mouth 2 times daily      nebivolol (BYSTOLIC) 10 MG tablet Take 10 mg by mouth daily      lisinopril (PRINIVIL;ZESTRIL) 20 MG tablet Take 20 mg by mouth 2 times daily      traZODone (DESYREL) 50 MG tablet Take 50 mg by mouth nightly      insulin glargine (LANTUS) 100 UNIT/ML injection vial Inject into the skin nightly      vitamin D (CHOLECALCIFEROL) 1000 UNIT TABS tablet Take 1,000 Units by mouth daily      potassium chloride (MICRO-K) 10 MEQ extended release capsule Take 10 mEq by mouth daily (Patient not taking: Reported on 11/1/2021)       No current facility-administered medications for this visit. No Known Allergies    ROS:  Constitutional: Negative for chills, fatigue, fever, or weight loss. Eyes: Denies reported visual changes. ENT: Denies headache, difficulty swallowing, nose bleeds, ringing in ears, or earaches. Cardiovascular: Negative for chest pain, palpitations, tachycardia or edema. Respiratory: Denies cough or SOB. GI:The patient denies abdominal or flank pain, anorexia, nausea or vomiting. : See HPI  Musculoskeletal: Patient denies low back pain or painful or reduced ROM of the back or extremities. Neurological: The patient denies any symptoms of neurological impairment or TIA's; no history of stroke. Lymphatic: Denies swollen glands in neck, axillary or inguinal areas. Psychiatric: Denies anxiety or depression. Skin: Denies rash or lesions. The remainder of the complete ROS is negative    PHYSICAL EXAM:  VITALS:  BP (!) 160/62   Ht 5' 7\" (1.702 m)   Wt 279 lb (126.6 kg)   BMI 43.70 kg/m² . Constitutional:    Alert and oriented times 3, no acute distress and cooperative to examination with appropriate mood and affect. HEENT:   Head:         Normocephalic and atraumatic. Mouth/Throat:          Mucous membranes are normal.   Eyes:         EOM are normal. No scleral icterus. Nose:    The external appearance of the nose is normal  Ears: The ears appear normal to external inspection   Neck:         Supple, symmetrical, trachea midline, no adenopathy, thyroid symmetric, not enlarged and no tenderness. Cardiovascular:        Normal rate, regular rhythm, S1 S2 heart sounds. Pulmonary/Chest:       Normal respiratory rate and rhthym. No use of accessory muscles. Abdominal:          Soft. No tenderness. Active bowel sounds. Musculoskeletal:    Normal range of motion. Extremities:    No cyanosis, clubbing, or edema present. Neurological:    Alert and oriented. DATA:  CBC:   Lab Results   Component Value Date    WBC 6.8 08/28/2021    RBC 4.12 08/28/2021    HGB 12.1 08/28/2021    HCT 36.5 08/28/2021    MCV 88.6 08/28/2021    MCH 29.3 08/28/2021    MCHC 33.0 08/28/2021    RDW 14.6 08/28/2021     08/28/2021     BMP:    Lab Results   Component Value Date     09/02/2021    K 4.6 09/02/2021     09/02/2021    CO2 25 09/02/2021    BUN 24 09/02/2021    CREATININE 1.27 09/02/2021    CALCIUM 8.50 09/02/2021    GLUCOSE 85 09/02/2021     BUN/Creatinine:    Lab Results   Component Value Date    BUN 24 09/02/2021    CREATININE 1.27 09/02/2021     Magnesium:    Lab Results   Component Value Date    MG 1.7 09/02/2021     Phosphorus:  No results found for: PHOS  PT/INR:  No results found for: PROTIME, INR  U/A:        Results for POC orders placed in visit on 11/01/21   POCT Urinalysis No Micro (Auto)   Result Value Ref Range    Glucose, Ur Negative NEGATIVE mg/dl    Bilirubin Urine Negative     Ketones, Urine Negative NEGATIVE    Specific Gravity, Urine 1.025 1.002 - 1.030    Blood, UA POC Negative NEGATIVE    pH, Urine 6.00 5.0 - 9.0    Protein, Urine Negative NEGATIVE mg/dl    Urobilinogen, Urine 1.00 0.0 - 1.0 eu/dl    Nitrite, Urine Negative NEGATIVE    Leukocyte Clumps, Urine Trace (A) NEGATIVE    Color, Urine Yellow YELLOW-STRAW    Character, Urine Clear CLR-SL.CLOUD          FINAL DIAGNOSIS:  A, C-H. Prostate, right apex, right mid, left mid, right base, left  base, lesion #1, and lesion #2, needle biopsies:   Benign prostatic tissue. B. Prostate, left apex, needle biopsy:      Adenocarcinoma of the prostate, [place] score 3+3 = 6, in one of 2      cores, 2/16 mm, 13%.         Assessment & Plan:        Prostate Cancer- Chaz 3+3      PSA at time of

## 2021-11-01 ENCOUNTER — OFFICE VISIT (OUTPATIENT)
Dept: UROLOGY | Age: 86
End: 2021-11-01
Payer: MEDICARE

## 2021-11-01 VITALS
WEIGHT: 279 LBS | BODY MASS INDEX: 43.79 KG/M2 | DIASTOLIC BLOOD PRESSURE: 62 MMHG | SYSTOLIC BLOOD PRESSURE: 160 MMHG | HEIGHT: 67 IN

## 2021-11-01 DIAGNOSIS — C61 PROSTATE CANCER (HCC): Primary | ICD-10-CM

## 2021-11-01 LAB
BILIRUBIN URINE: NEGATIVE
BLOOD URINE, POC: NEGATIVE
CHARACTER, URINE: CLEAR
COLOR, URINE: YELLOW
GLUCOSE URINE: NEGATIVE MG/DL
KETONES, URINE: NEGATIVE
LEUKOCYTE CLUMPS, URINE: ABNORMAL
NITRITE, URINE: NEGATIVE
PH, URINE: 6 (ref 5–9)
PROTEIN, URINE: NEGATIVE MG/DL
SPECIFIC GRAVITY, URINE: 1.02 (ref 1–1.03)
UROBILINOGEN, URINE: 1 EU/DL (ref 0–1)

## 2021-11-01 PROCEDURE — G8484 FLU IMMUNIZE NO ADMIN: HCPCS | Performed by: NURSE PRACTITIONER

## 2021-11-01 PROCEDURE — 99213 OFFICE O/P EST LOW 20 MIN: CPT | Performed by: NURSE PRACTITIONER

## 2021-11-01 PROCEDURE — 81003 URINALYSIS AUTO W/O SCOPE: CPT | Performed by: NURSE PRACTITIONER

## 2021-11-01 PROCEDURE — 1123F ACP DISCUSS/DSCN MKR DOCD: CPT | Performed by: NURSE PRACTITIONER

## 2021-11-01 PROCEDURE — 1036F TOBACCO NON-USER: CPT | Performed by: NURSE PRACTITIONER

## 2021-11-01 PROCEDURE — G8427 DOCREV CUR MEDS BY ELIG CLIN: HCPCS | Performed by: NURSE PRACTITIONER

## 2021-11-01 PROCEDURE — G8417 CALC BMI ABV UP PARAM F/U: HCPCS | Performed by: NURSE PRACTITIONER

## 2021-11-01 PROCEDURE — 4040F PNEUMOC VAC/ADMIN/RCVD: CPT | Performed by: NURSE PRACTITIONER

## 2021-12-29 LAB — PROSTATE SPECIFIC ANTIGEN: 5.74 NG/ML

## 2022-01-05 ENCOUNTER — OFFICE VISIT (OUTPATIENT)
Dept: UROLOGY | Age: 87
End: 2022-01-05
Payer: MEDICARE

## 2022-01-05 VITALS
WEIGHT: 279 LBS | BODY MASS INDEX: 43.79 KG/M2 | SYSTOLIC BLOOD PRESSURE: 122 MMHG | DIASTOLIC BLOOD PRESSURE: 72 MMHG | HEIGHT: 67 IN

## 2022-01-05 DIAGNOSIS — C61 PROSTATE CANCER (HCC): Primary | ICD-10-CM

## 2022-01-05 PROCEDURE — G8484 FLU IMMUNIZE NO ADMIN: HCPCS | Performed by: NURSE PRACTITIONER

## 2022-01-05 PROCEDURE — 4040F PNEUMOC VAC/ADMIN/RCVD: CPT | Performed by: NURSE PRACTITIONER

## 2022-01-05 PROCEDURE — 99213 OFFICE O/P EST LOW 20 MIN: CPT | Performed by: NURSE PRACTITIONER

## 2022-01-05 PROCEDURE — 1123F ACP DISCUSS/DSCN MKR DOCD: CPT | Performed by: NURSE PRACTITIONER

## 2022-01-05 PROCEDURE — G8427 DOCREV CUR MEDS BY ELIG CLIN: HCPCS | Performed by: NURSE PRACTITIONER

## 2022-01-05 PROCEDURE — 1036F TOBACCO NON-USER: CPT | Performed by: NURSE PRACTITIONER

## 2022-01-05 PROCEDURE — G8417 CALC BMI ABV UP PARAM F/U: HCPCS | Performed by: NURSE PRACTITIONER

## 2022-01-05 NOTE — PROGRESS NOTES
[facility]ESSIONAL [facility]  [facility]  [address] [address] [address]  Dept: [phone]  Dept Fax: [phone]: [phone]      Visit Date: 2022    HPI:     Jose Alberto Cardenas presents for follow-up of prostate cancer. In 2018, he had MRI of pelvis completed for elevated PSA and showed PIRADS 3 lesion. Michelle Venegas biopsy was completed 10-8-18 by Dr. Qasim Terrell, PSA 4.88 at that time. 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 Tissue was sent for Decipher and showed patient was low risk. Denies any new urinary symptoms. No urgency, frequency or incomplete bladder emptying. Nocturia x1. No new  Bone pain or abdominal pain    Most recent PSA 5.74, has come down. Pt is on Flomax. No family history of prostate cancer. Past Medical History:   Diagnosis Date    [facility] mellitus ([facility]     Hypertension     Prostate cancer ([facility] [facility]       No past surgical history on file. No family history on file.     Social History     Tobacco Use    Smoking status: Former Smoker     Quit date:      Years since quittin.0    Smokeless tobacco: Never Used   Substance Use Topics    Alcohol use: Not on file      Current Outpatient Medications   Medication Sig Dispense Refill    tamsulosin (FLOMAX) 0.4 MG capsule 1 capsule      Multiple Vitamins-Minerals (CENTRUM SILVER 50+MEN PO) Take by mouth      chlorthalidone (HYGROTON) 25 MG tablet Take 25 mg by mouth daily      simvastatin (ZOCOR) 40 MG tablet Take 40 mg by mouth nightly      Niacin ER 1000 MG TBCR Take by mouth      hydrALAZINE (APRESOLINE) 25 MG tablet Take 25 mg by mouth 2 times daily      nebivolol (BYSTOLIC) 10 MG tablet Take 10 mg by mouth daily      potassium chloride (MICRO-K) 10 MEQ extended release capsule Take 10 mEq by mouth daily       lisinopril (PRINIVIL;ZESTRIL) 20 MG tablet Take 20 mg by mouth 2 times daily      traZODone (DESYREL) 50 MG tablet Take 50 mg by mouth nightly      insulin glargine (LANTUS) 100 UNIT/ML injection vial Inject into the skin nightly      vitamin D (CHOLECALCIFEROL) 1000 UNIT TABS tablet Take 1,000 Units by mouth daily       No current facility-administered medications for this visit. No Known Allergies    ROS:  Constitutional: Negative for chills, fatigue, fever, or weight loss. Eyes: Denies reported visual changes. ENT: Denies headache, difficulty swallowing, nose bleeds, ringing in ears, or earaches. Cardiovascular: Negative for chest pain, palpitations, tachycardia or edema. Respiratory: Denies cough or SOB. GI:The patient denies abdominal or flank pain, anorexia, nausea or vomiting. : See HPI  Musculoskeletal: Patient denies low back pain or painful or reduced ROM of the back or extremities. Neurological: The patient denies any symptoms of neurological impairment or TIA's; no history of stroke. Lymphatic: Denies swollen glands in neck, axillary or inguinal areas. Psychiatric: Denies anxiety or depression. Skin: Denies rash or lesions. The remainder of the complete ROS is negative    PHYSICAL EXAM:  VITALS:  /72   Ht 5' 7\" (1.702 m)   Wt 279 lb (126.6 kg)   BMI 43.70 kg/m² . Constitutional:    Alert and oriented times 3, no acute distress and cooperative to examination with appropriate mood and affect. HEENT:   Head:         Normocephalic and atraumatic. Mouth/Throat:          Mucous membranes are normal.   Eyes:         EOM are normal. No scleral icterus. Nose:    The external appearance of the nose is normal  Ears: The ears appear normal to external inspection     Pulmonary/Chest:       Normal respiratory rate and rhthym. No use of accessory muscles. Abdominal:          Soft. No tenderness. Active bowel sounds. Musculoskeletal:    Normal range of motion. Extremities:    No cyanosis, clubbing, or edema present. Neurological:    Alert and oriented.     DATA:  CBC:   Lab Results   Component Value Date    WBC 6.8 08/28/2021    RBC 4.12 08/28/2021    HGB 12.1 08/28/2021    HCT 36.5 08/28/2021    MCV 88.6 08/28/2021    MCH 29.3 08/28/2021    MCHC 33.0 08/28/2021    RDW 14.6 08/28/2021     08/28/2021     BMP:    Lab Results   Component Value Date     09/02/2021    K 4.6 09/02/2021     09/02/2021    CO2 25 09/02/2021    BUN 24 09/02/2021    CREATININE 1.27 09/02/2021    CALCIUM 8.50 09/02/2021    GLUCOSE 85 09/02/2021     BUN/Creatinine:    Lab Results   Component Value Date    BUN 24 09/02/2021    CREATININE 1.27 09/02/2021     Magnesium:    Lab Results   Component Value Date    MG 1.7 09/02/2021     Phosphorus:  No results found for: PHOS  PT/INR:  No results found for: PROTIME, INR  U/A:        No results found for this visit on 01/05/22. FINAL DIAGNOSIS:  A, C-H. Prostate, right apex, right mid, left mid, right base, left  base, lesion #1, and lesion #2, needle biopsies:   Benign prostatic tissue. B. Prostate, left apex, needle biopsy:      Adenocarcinoma of the prostate, Chaz score 3+3 = 6, in one of 2      cores, 2/16 mm, 13%. Assessment & Plan:        Prostate Cancer- Chaz 3+3  BPH    PSA at time of biopsy 4.88. Cynthia Tate biopsy done in 2018 for PIRADS 3 lesion, showed Chaz 6 prostate cancer in 1 of 2 cores. Patient has been on active surveillance. Decipher showed low risk.  Most recent PSA 5.74 down from 6.38  Continue active surveillance, PSA 6 months    BPH- continue flomax   IPSS 7  Discussed finasteride if symptoms worsen    FU 6 months        Electronically signed by BRIEN Perez Asa, CNP on 1/5/2022 at 2:50 PM

## 2022-06-29 LAB — PROSTATE SPECIFIC ANTIGEN: 5.8 NG/ML

## 2022-07-06 ENCOUNTER — OFFICE VISIT (OUTPATIENT)
Dept: UROLOGY | Age: 87
End: 2022-07-06
Payer: MEDICARE

## 2022-07-06 VITALS
BODY MASS INDEX: 41.91 KG/M2 | HEIGHT: 67 IN | DIASTOLIC BLOOD PRESSURE: 72 MMHG | WEIGHT: 267 LBS | SYSTOLIC BLOOD PRESSURE: 144 MMHG

## 2022-07-06 DIAGNOSIS — N13.8 BPH WITH OBSTRUCTION/LOWER URINARY TRACT SYMPTOMS: ICD-10-CM

## 2022-07-06 DIAGNOSIS — N40.1 BPH WITH OBSTRUCTION/LOWER URINARY TRACT SYMPTOMS: ICD-10-CM

## 2022-07-06 DIAGNOSIS — C61 PROSTATE CANCER (HCC): Primary | ICD-10-CM

## 2022-07-06 PROCEDURE — 1036F TOBACCO NON-USER: CPT | Performed by: UROLOGY

## 2022-07-06 PROCEDURE — G8427 DOCREV CUR MEDS BY ELIG CLIN: HCPCS | Performed by: UROLOGY

## 2022-07-06 PROCEDURE — G8417 CALC BMI ABV UP PARAM F/U: HCPCS | Performed by: UROLOGY

## 2022-07-06 PROCEDURE — 99214 OFFICE O/P EST MOD 30 MIN: CPT | Performed by: UROLOGY

## 2022-07-06 PROCEDURE — 1123F ACP DISCUSS/DSCN MKR DOCD: CPT | Performed by: UROLOGY

## 2022-07-06 RX ORDER — ESOMEPRAZOLE MAGNESIUM 40 MG/1
40 FOR SUSPENSION ORAL DAILY
COMMUNITY

## 2022-07-06 RX ORDER — GLIPIZIDE 5 MG/1
TABLET, FILM COATED, EXTENDED RELEASE ORAL
COMMUNITY
Start: 2022-07-03

## 2022-07-06 RX ORDER — RIBOFLAVIN (VITAMIN B2) 100 MG
100 TABLET ORAL DAILY
COMMUNITY

## 2022-07-06 NOTE — PROGRESS NOTES
Dr. Dominic Bentley MD  [facility])  Urology Clinic      Patient:  Beni Kidd  YOB: 1935  Date: 7/6/2022    HISTORY OF PRESENT ILLNESS:   The patient is a 82 y.o. male who presents today for follow-up for the following problem(s): Low risk prostate cancer. Overall the problem(s) : show no change. Associated Symptoms: No dysuria, gross hematuria. Pain Severity: 0/10    Summary of old records: In 2018, he had MRI of pelvis completed for elevated PSA and showed PIRADS 3 lesion. Danial Cope biopsy was completed 10-8-18 by Dr. Fatoumata Masters, PSA 4.88 at that time. 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    -Tissue was sent for Decipher and showed patient was low risk. Imaging/Labs reviewed during today's visit:  I have independently reviewed and verified the following films during today's visit. PSA    Last several PSA's:  Lab Results   Component Value Date    PSA 5.80 (H) 06/29/2022    PSA 6.11 (H) 06/02/2022    PSA 5.74 (H) 12/29/2021       Last total testosterone:  No results found for: TESTOSTERONE    Urinalysis today:  No results found for this visit on 07/06/22. Last BUN and creatinine:  Lab Results   Component Value Date    BUN 27 (H) 06/02/2022     Lab Results   Component Value Date    CREATININE 1.36 (H) 06/02/2022       Imaging Reviewed during this Office Visit:   (results were independently reviewed by physician and radiology report verified)    PAST MEDICAL, FAMILY AND SOCIAL HISTORY UPDATE:  Past Medical History:   Diagnosis Date    Diabetes mellitus ([address]     Hypertension     Prostate cancer ([address]      No past surgical history on file. No family history on file.   Outpatient Medications Marked as Taking for the 7/6/22 encounter (Office Visit) with Dominic Bentley MD   Medication Sig Dispense Refill    Ascorbic Acid (VITAMIN C) 100 MG tablet Take 100 mg by mouth daily      Apoaequorin ([place] PO) Take by mouth      esomeprazole Magnesium (NEXIUM) 40 MG PACK Take 40 mg by mouth daily      Garlic 016 MG TABS Take by mouth      Isa, Zingiber officinalis, (ISA ROOT PO) Take 1,100 mg by mouth      glipiZIDE (GLUCOTROL XL) 5 MG extended release tablet       tamsulosin (FLOMAX) 0.4 MG capsule 1 capsule      Multiple Vitamins-Minerals (CENTRUM SILVER 50+MEN PO) Take by mouth      chlorthalidone (HYGROTON) 25 MG tablet Take 25 mg by mouth daily      simvastatin (ZOCOR) 40 MG tablet Take 40 mg by mouth nightly      Niacin ER 1000 MG TBCR Take by mouth      hydrALAZINE (APRESOLINE) 25 MG tablet Take 25 mg by mouth 2 times daily      nebivolol (BYSTOLIC) 10 MG tablet Take 10 mg by mouth daily      potassium chloride (MICRO-K) 10 MEQ extended release capsule Take 10 mEq by mouth daily       lisinopril (PRINIVIL;ZESTRIL) 20 MG tablet Take 20 mg by mouth 2 times daily      traZODone (DESYREL) 50 MG tablet Take 50 mg by mouth nightly      insulin glargine (LANTUS) 100 UNIT/ML injection vial Inject into the skin nightly      vitamin D (CHOLECALCIFEROL) 1000 UNIT TABS tablet Take 1,000 Units by mouth daily         Patient has no known allergies. Social History     Tobacco Use   Smoking Status Former Smoker    Quit date:     Years since quittin.5   Smokeless Tobacco Never Used       Social History     Substance and Sexual Activity   Alcohol Use None       REVIEW OF SYSTEMS:  Constitutional: negative  Eyes: negative  Respiratory: negative  Cardiovascular: negative  Gastrointestinal: negative  Musculoskeletal: negative  Genitourinary: negative except for what is in HPI  Skin: negative   Neurological: negative  Hematological/Lymphatic: negative  Psychological: negative    Physical Exam:      Vitals:    22 1344   BP: (!) 144/72     Patient is a 82 y.o. male in no acute distress and alert and oriented to person, place and time.   NAD, A/o  Non labored respiration  Normal peripheral pulses  Skin- warm and dry  Psych- normal mood and affect      Assessment and Plan      1. Prostate cancer ([address]    2. BPH with obstruction/lower urinary tract symptoms           Plan:      No follow-ups on file. PSA stable. Low risk genetically. Flomax 0.4 mg po qd for BPH symptoms. See back in 6-9 months with repeat PSA.           Dr. Lul Weber MD

## 2023-04-19 ENCOUNTER — OFFICE VISIT (OUTPATIENT)
Dept: UROLOGY | Age: 88
End: 2023-04-19
Payer: MEDICARE

## 2023-04-19 VITALS — WEIGHT: 267 LBS | RESPIRATION RATE: 15 BRPM | BODY MASS INDEX: 41.91 KG/M2 | HEIGHT: 67 IN

## 2023-04-19 DIAGNOSIS — N13.8 BPH WITH OBSTRUCTION/LOWER URINARY TRACT SYMPTOMS: ICD-10-CM

## 2023-04-19 DIAGNOSIS — C61 PROSTATE CANCER (HCC): Primary | ICD-10-CM

## 2023-04-19 DIAGNOSIS — N40.1 BPH WITH OBSTRUCTION/LOWER URINARY TRACT SYMPTOMS: ICD-10-CM

## 2023-04-19 PROCEDURE — 1036F TOBACCO NON-USER: CPT | Performed by: UROLOGY

## 2023-04-19 PROCEDURE — G8427 DOCREV CUR MEDS BY ELIG CLIN: HCPCS | Performed by: UROLOGY

## 2023-04-19 PROCEDURE — 99214 OFFICE O/P EST MOD 30 MIN: CPT | Performed by: UROLOGY

## 2023-04-19 PROCEDURE — G8417 CALC BMI ABV UP PARAM F/U: HCPCS | Performed by: UROLOGY

## 2023-04-19 PROCEDURE — 1123F ACP DISCUSS/DSCN MKR DOCD: CPT | Performed by: UROLOGY

## 2023-04-19 NOTE — PROGRESS NOTES
Dr. Anthony Hughes MD  [facility])  Urology Clinic      Patient:  Rachel Britt  YOB: 1935  Date: 4/19/2023    HISTORY OF PRESENT ILLNESS:   The patient is a 82 y.o. male who presents today for follow-up for the following problem(s): Low risk prostate cancer. PSA worsened this year. Overall the problem(s) : show no change. Associated Symptoms: No dysuria, gross hematuria. Pain Severity: 0/10    Summary of old records: In 2018, he had MRI of pelvis completed for elevated PSA and showed PIRADS 3 lesion. Lanis Boot biopsy was completed 10-8-18 by Dr. Shannan Gomez, PSA 4.88 at that time. 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    -Tissue was sent for Decipher and showed patient was low risk. Imaging/Labs reviewed during today's visit:  I have independently reviewed and verified the following films during today's visit. PSA    Last several PSA's:  Lab Results   Component Value Date    PSA 8.86 (H) 04/13/2023    PSA 5.80 (H) 06/29/2022    PSA 6.11 (H) 06/02/2022       Last total testosterone:  No results found for: TESTOSTERONE    Urinalysis today:  No results found for this visit on 04/19/23. Last BUN and creatinine:  Lab Results   Component Value Date    BUN 27 (H) 06/02/2022     Lab Results   Component Value Date    CREATININE 1.36 (H) 06/02/2022       Imaging Reviewed during this Office Visit:   (results were independently reviewed by physician and radiology report verified)    PAST MEDICAL, FAMILY AND SOCIAL HISTORY UPDATE:  Past Medical History:   Diagnosis Date    Diabetes mellitus ([address]     Hypertension     Prostate cancer ([address]      No past surgical history on file. No family history on file.   Outpatient Medications Marked as Taking for the 4/19/23 encounter (Office Visit) with Anthony Hughes MD   Medication Sig Dispense Refill    Ascorbic Acid (VITAMIN C) 100 MG tablet Take 1 tablet by mouth daily

## 2023-07-13 LAB — PROSTATE SPECIFIC ANTIGEN: 10.54 NG/ML

## 2023-07-19 ENCOUNTER — TELEPHONE (OUTPATIENT)
Dept: UROLOGY | Age: 88
End: 2023-07-19

## 2023-07-19 ENCOUNTER — OFFICE VISIT (OUTPATIENT)
Dept: UROLOGY | Age: 88
End: 2023-07-19
Payer: MEDICARE

## 2023-07-19 VITALS — RESPIRATION RATE: 15 BRPM | HEIGHT: 67 IN | BODY MASS INDEX: 41.91 KG/M2 | WEIGHT: 267 LBS

## 2023-07-19 DIAGNOSIS — N40.1 BPH WITH OBSTRUCTION/LOWER URINARY TRACT SYMPTOMS: Primary | ICD-10-CM

## 2023-07-19 DIAGNOSIS — N13.8 BPH WITH OBSTRUCTION/LOWER URINARY TRACT SYMPTOMS: Primary | ICD-10-CM

## 2023-07-19 DIAGNOSIS — C61 PROSTATE CANCER (HCC): ICD-10-CM

## 2023-07-19 PROCEDURE — 1123F ACP DISCUSS/DSCN MKR DOCD: CPT | Performed by: UROLOGY

## 2023-07-19 PROCEDURE — G8417 CALC BMI ABV UP PARAM F/U: HCPCS | Performed by: UROLOGY

## 2023-07-19 PROCEDURE — 1036F TOBACCO NON-USER: CPT | Performed by: UROLOGY

## 2023-07-19 PROCEDURE — G8427 DOCREV CUR MEDS BY ELIG CLIN: HCPCS | Performed by: UROLOGY

## 2023-07-19 PROCEDURE — 99214 OFFICE O/P EST MOD 30 MIN: CPT | Performed by: UROLOGY

## 2023-07-19 NOTE — PROGRESS NOTES
Dr. Israel Richmond MD  [facility])  Urology Clinic      Patient:  Dione Nageotte  YOB: 1935  Date: 7/19/2023    HISTORY OF PRESENT ILLNESS:   The patient is a 82 y.o. male who presents today for follow-up for the following problem(s): Low risk prostate cancer. PSA worsened this year. Overall the problem(s) : worsening. Associated Symptoms: No dysuria, gross hematuria. Pain Severity: 0/10    Summary of old records: In 2018, he had MRI of pelvis completed for elevated PSA and showed PIRADS 3 lesion. Vallorie Stanley biopsy was completed 10-8-18 by Dr. Cullen Calderon, PSA 4.88 at that time. 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    -Tissue was sent for Decipher and showed patient was low risk. Imaging/Labs reviewed during today's visit:  I have independently reviewed and verified the following films during today's visit. PSA    Last several PSA's:  Lab Results   Component Value Date    PSA 10.54 (H) 07/13/2023    PSA 9.07 (H) 06/08/2023    PSA 8.86 (H) 04/13/2023       Last total testosterone:  No results found for: TESTOSTERONE    Urinalysis today:  No results found for this visit on 07/19/23. Last BUN and creatinine:  Lab Results   Component Value Date    BUN 32 (H) 06/08/2023     Lab Results   Component Value Date    CREATININE 1.24 06/08/2023       Imaging Reviewed during this Office Visit:   (results were independently reviewed by physician and radiology report verified)    PAST MEDICAL, FAMILY AND SOCIAL HISTORY UPDATE:  Past Medical History:   Diagnosis Date    Diabetes mellitus ([address])     Hypertension     Prostate cancer ([address])      No past surgical history on file. No family history on file.   Outpatient Medications Marked as Taking for the 7/19/23 encounter (Office Visit) with Israel Richmond MD   Medication Sig Dispense Refill    Ascorbic Acid (VITAMIN C) 100 MG tablet Take 1 tablet by mouth daily

## 2023-07-19 NOTE — TELEPHONE ENCOUNTER
Patient scheduled for MRI PROSTATE W WO  at [facility] MR on 8/9/23 ARRIVAL OF 10 AM FOR A 1030 AM SCAN TIME WITH NO METAL JEWELRY.     Order mailed with instructions or given to the patient in the office

## 2023-08-09 ENCOUNTER — HOSPITAL ENCOUNTER (OUTPATIENT)
Dept: MRI IMAGING | Age: 88
Discharge: HOME | End: 2023-08-09
Attending: UROLOGY
Payer: MEDICARE

## 2023-08-09 DIAGNOSIS — C61 PROSTATE CANCER (HCC): ICD-10-CM

## 2023-08-09 LAB — POC CREATININE WHOLE BLOOD: 1.4 MG/DL (ref 0.5–1.2)

## 2023-08-09 PROCEDURE — 82565 ASSAY OF CREATININE: CPT

## 2023-08-09 PROCEDURE — 6360000004 HC RX CONTRAST MEDICATION: Performed by: UROLOGY

## 2023-08-09 PROCEDURE — 76377 3D RENDER W/INTRP POSTPROCES: CPT

## 2023-08-09 PROCEDURE — A9579 GAD-BASE MR CONTRAST NOS,1ML: HCPCS | Performed by: UROLOGY

## 2023-08-09 RX ADMIN — GADOTERIDOL 20 ML: 279.3 INJECTION, SOLUTION INTRAVENOUS at 11:19

## 2023-08-16 ENCOUNTER — OFFICE VISIT (OUTPATIENT)
Dept: UROLOGY | Age: 88
End: 2023-08-16
Payer: MEDICARE

## 2023-08-16 VITALS — HEIGHT: 67 IN | BODY MASS INDEX: 41.28 KG/M2 | RESPIRATION RATE: 16 BRPM | WEIGHT: 263 LBS

## 2023-08-16 DIAGNOSIS — N40.1 BPH WITH OBSTRUCTION/LOWER URINARY TRACT SYMPTOMS: Primary | ICD-10-CM

## 2023-08-16 DIAGNOSIS — C61 PROSTATE CANCER (HCC): ICD-10-CM

## 2023-08-16 DIAGNOSIS — N13.8 BPH WITH OBSTRUCTION/LOWER URINARY TRACT SYMPTOMS: Primary | ICD-10-CM

## 2023-08-16 PROCEDURE — 1036F TOBACCO NON-USER: CPT | Performed by: UROLOGY

## 2023-08-16 PROCEDURE — 1123F ACP DISCUSS/DSCN MKR DOCD: CPT | Performed by: UROLOGY

## 2023-08-16 PROCEDURE — G8417 CALC BMI ABV UP PARAM F/U: HCPCS | Performed by: UROLOGY

## 2023-08-16 PROCEDURE — 99214 OFFICE O/P EST MOD 30 MIN: CPT | Performed by: UROLOGY

## 2023-08-16 PROCEDURE — G8427 DOCREV CUR MEDS BY ELIG CLIN: HCPCS | Performed by: UROLOGY

## 2023-08-16 NOTE — PROGRESS NOTES
Per patient medication list is the same as previous reviewed. Medication list not available to review.

## 2023-08-16 NOTE — PROGRESS NOTES
Dr. Kwan Botello MD  [facility])  Urology Clinic      Patient:  Brooklyn Romero  YOB: 1935  Date: 8/16/2023    HISTORY OF PRESENT ILLNESS:   The patient is a 82 y.o. male who presents today for follow-up for the following problem(s): Low risk prostate cancer. PSA worsened this year. Overall the problem(s) : worsening. Associated Symptoms: No dysuria, gross hematuria. Pain Severity: 0/10    Summary of old records: In 2018, he had MRI of pelvis completed for elevated PSA and showed PIRADS 3 lesion. [facility] by Dr. Rafael Rossi, PSA 4.88 at that time. 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    -Tissue was sent for Decipher and showed patient was low risk. Imaging/Labs reviewed during today's visit:  I have independently reviewed and verified the following films during today's visit. Prostate MRI. 116g. PIRADS-4    Last several PSA's:  Lab Results   Component Value Date    PSA 10.54 (H) 07/13/2023    PSA 9.07 (H) 06/08/2023    PSA 8.86 (H) 04/13/2023       Last total testosterone:  No results found for: TESTOSTERONE    Urinalysis today:  No results found for this visit on 08/16/23. Last BUN and creatinine:  Lab Results   Component Value Date    BUN 32 (H) 06/08/2023     Lab Results   Component Value Date    CREATININE 1.24 06/08/2023       Imaging Reviewed during this Office Visit:   (results were independently reviewed by physician and radiology report verified)    PAST MEDICAL, FAMILY AND SOCIAL HISTORY UPDATE:  Past Medical History:   Diagnosis Date    Diabetes mellitus ([address])     Hypertension     Prostate cancer ([address])      No past surgical history on file. No family history on file. No outpatient medications have been marked as taking for the 8/16/23 encounter (Office Visit) with Kwan Botello MD.       Patient has no known allergies.   Social History     Tobacco Use

## 2023-09-01 ENCOUNTER — TELEPHONE (OUTPATIENT)
Dept: UROLOGY | Age: 88
End: 2023-09-01

## 2023-09-01 RX ORDER — CIPROFLOXACIN 500 MG/1
500 TABLET, FILM COATED ORAL 2 TIMES DAILY
Qty: 6 TABLET | Refills: 0 | Status: SHIPPED | OUTPATIENT
Start: 2023-10-03 | End: 2023-10-06

## 2023-09-01 NOTE — TELEPHONE ENCOUNTER
Patient advised cipro was sent to the pharmacy to start the day prior to the procedure and to monitor for hypoglycemia while taking it. He voiced understanding.
Please send antibiotics for procedure
Sent. Please make patient aware of the following: The hypoglycemic effect of Sulfonylureas may be increased by Quinolones especially in elderly patients with renal compromise. Hypoglycemia symptoms including lightheadedness, diaphoresis, tachycardia and various neurologic and psychiatric disturbances may occur. Patient should monitor their blood sugar more closely.      The patient should go to the ED should they develop fever, chills, nausea, vomiting, chest pain, SOB, calf pain, feelings of incomplete emptying, or should they otherwise feel they need evaluated ]
YOU MAY TAKE TYLENOL IF NEEDED  2. Take your antibiotics as directed:  Cipro 500 mg twice a day, start on:   10/3/2023    take until finished. 3.  Office to provide the Tobramycin an injectable antibiotic the day of the procedure . You will be given the injection once you are brought back to the room  4. Do a Fleets Enema 2 hours before the visit. Purchase it at any drug store and follow the instructions on the package. 5. Please ensure to bring a  with you the day of the surgery to transport you home. HOME GOING AND FOLLOW UP INSTRUCTIONS  Call the doctor if: 1. There is a large amount of blood or clots in the urine or stool. 2.  You are unable to urinate. 3.  If your temperature is 101 degrees F or greater. 4.  You could see blood in your semen for up to 2-months            5.   You may resume your regular medication 3-days after procedure    DATE: 9/1/2023       SIGNATURE:________________________________

## 2023-10-04 ENCOUNTER — PROCEDURE VISIT (OUTPATIENT)
Dept: UROLOGY | Age: 88
End: 2023-10-04

## 2023-10-04 VITALS — HEIGHT: 67 IN | RESPIRATION RATE: 16 BRPM | WEIGHT: 270 LBS | BODY MASS INDEX: 42.38 KG/M2

## 2023-10-04 DIAGNOSIS — N40.1 BPH WITH OBSTRUCTION/LOWER URINARY TRACT SYMPTOMS: Primary | ICD-10-CM

## 2023-10-04 DIAGNOSIS — C61 PROSTATE CANCER (HCC): ICD-10-CM

## 2023-10-04 DIAGNOSIS — N13.8 BPH WITH OBSTRUCTION/LOWER URINARY TRACT SYMPTOMS: Primary | ICD-10-CM

## 2023-10-04 RX ORDER — TOBRAMYCIN 40 MG/ML
80 INJECTION INTRAMUSCULAR; INTRAVENOUS ONCE
Status: COMPLETED | OUTPATIENT
Start: 2023-10-04 | End: 2023-10-04

## 2023-10-04 RX ADMIN — TOBRAMYCIN 80 MG: 40 INJECTION INTRAMUSCULAR; INTRAVENOUS at 11:10

## 2023-10-04 NOTE — PROGRESS NOTES
Procedure: Trus/Biopsy  Pt name and birth date verified Yes  Patient agrees Dr. Nisa Abreu is taking biopsies of the prostate Yes  Patient took Enema 2 hours prior to procedure Yes  Patient took 2 pill(s) of Cipro day before procedure, day of, and will the day after Yes  Has patient eaten today? Yes  Patient stopped all blood thinners prior to surgery Yes     Patient has given me verbal consent to perform Tobramycin Injection  Yes    Following Dr. George Sultana of care. Tobramycin 80MG/2ML GIVEN I.M right UOQ HIP  Lot Number: [id number]  Expiration Date: 05/20/2024  [facility] #: [id number]    Tobramycin supplied by office.

## 2023-10-04 NOTE — PROGRESS NOTES
Dr. Han Grubbs MD  Urologic Surgery      [place], [place]. [facility]  10/04/23    Patient:  Mykel Moore  MRN: [id number]  YOB: 1935    Surgeon: Dr. Han Grubbs MD  Assistant: None    Pre-op Diagnosis: Elevated PSA. Post-op Diagnosis: Elevated PSA. Procedure:   Trans-Rectal Ultrasound Guided Biopsy of the Prostate    Findings:        --Gland Size: 75g       --Prostate Lesions: None    Anesthesia: Local  Complications: None  OR Blood Loss: Minimal  Fluids: Cystalloids  Specimens: Prostate Cores      Narrative of the Procedure:    After informed consent was obtain, the patient was taken to the operative suite. He remained on the [facility]. He was rolled onto the side. The legs were brought toward the chest. Antibiotics were given. A timeout occurred in which two patient identifiers were used. The rectal ultrasound probe was inserted and volumetric measurements were taken. The prostate volume was 75 grams. The prostate was assessed in its entirety and no hypoechoic or otherwise abnormal lesions were noted. The bilateral neurovascular bundles were located and 1% lidocaine local anesthetic was injected bilaterally for local nerve blocks. Starting at the base of the gland and worked apically, sampling was completed. A standard sextant template was employed bilaterally. A total of 1 core was taken within each quadrant for a total of 12 biopsies. We then did [place] and targeted all 3 areas. Once completed, the rectal ultrasound probe was removed. Inspection of the rectum demonstrated no active bleeding. The patient was rolled back into the supine position. He was then discharged back to the PACU in good and stable condition. Follow-Up: The patient will be discharged home today. Once resulted, the pathology report will be discussed with the patient.     Han Grubbs MD  Electronically signed on 10/4/2023 at 3:55 PM

## 2023-10-18 ENCOUNTER — OFFICE VISIT (OUTPATIENT)
Dept: UROLOGY | Age: 88
End: 2023-10-18
Payer: MEDICARE

## 2023-10-18 VITALS — HEIGHT: 67 IN | WEIGHT: 276 LBS | BODY MASS INDEX: 43.32 KG/M2 | RESPIRATION RATE: 16 BRPM

## 2023-10-18 DIAGNOSIS — C61 PROSTATE CANCER (HCC): ICD-10-CM

## 2023-10-18 DIAGNOSIS — N13.8 BPH WITH OBSTRUCTION/LOWER URINARY TRACT SYMPTOMS: Primary | ICD-10-CM

## 2023-10-18 DIAGNOSIS — N40.1 BPH WITH OBSTRUCTION/LOWER URINARY TRACT SYMPTOMS: Primary | ICD-10-CM

## 2023-10-18 PROCEDURE — 1036F TOBACCO NON-USER: CPT | Performed by: UROLOGY

## 2023-10-18 PROCEDURE — G8427 DOCREV CUR MEDS BY ELIG CLIN: HCPCS | Performed by: UROLOGY

## 2023-10-18 PROCEDURE — G8417 CALC BMI ABV UP PARAM F/U: HCPCS | Performed by: UROLOGY

## 2023-10-18 PROCEDURE — 99214 OFFICE O/P EST MOD 30 MIN: CPT | Performed by: UROLOGY

## 2023-10-18 PROCEDURE — G8484 FLU IMMUNIZE NO ADMIN: HCPCS | Performed by: UROLOGY

## 2023-10-18 PROCEDURE — 1123F ACP DISCUSS/DSCN MKR DOCD: CPT | Performed by: UROLOGY

## 2023-10-18 NOTE — PROGRESS NOTES
Dr. Nilesh Pérez MD  [facility])  Urology Clinic      Patient:  Rg Wren  YOB: 1935  Date: 10/18/2023    HISTORY OF PRESENT ILLNESS:   The patient is a 82 y.o. male who presents today for follow-up for the following problem(s): Low risk prostate cancer. PSA worsened this year. Overall the problem(s) : worsening. Associated Symptoms: No dysuria, gross hematuria. Pain Severity: 0/10    Summary of old records: In 2018, he had MRI of pelvis completed for elevated PSA and showed PIRADS 3 lesion. Gloria Sears biopsy was completed 10-8-18 by Dr. Ramila Elizabeth, PSA 4.88 at that time. 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    -Tissue was sent for Decipher and showed patient was low risk. Repeat MRI and biopsy in 2023 continues to show low risk [place] 6 disease. Imaging/Labs reviewed during today's visit:  I have independently reviewed and verified the following films during today's visit. Prostate MRI. 116g. PIRADS-4    Last several PSA's:  Lab Results   Component Value Date    PSA 10.54 (H) 07/13/2023    PSA 9.07 (H) 06/08/2023    PSA 8.86 (H) 04/13/2023       Last total testosterone:  No results found for: \"TESTOSTERONE\"    Urinalysis today:  No results found for this visit on 10/18/23. Last BUN and creatinine:  Lab Results   Component Value Date    BUN 32 (H) 06/08/2023     Lab Results   Component Value Date    CREATININE 1.24 06/08/2023       Imaging Reviewed during this Office Visit:   (results were independently reviewed by physician and radiology report verified)    PAST MEDICAL, FAMILY AND SOCIAL HISTORY UPDATE:  Past Medical History:   Diagnosis Date    Diabetes mellitus ([address])     Hypertension     Prostate cancer ([address])      History reviewed. No pertinent surgical history. History reviewed. No pertinent family history.   Outpatient Medications Marked as Taking for the 10/18/23 encounter (Office

## 2024-07-24 LAB — PROSTATE SPECIFIC ANTIGEN: 7.69 NG/ML

## 2024-07-31 ENCOUNTER — OFFICE VISIT (OUTPATIENT)
Dept: UROLOGY | Age: 89
End: 2024-07-31
Payer: MEDICARE

## 2024-07-31 VITALS — WEIGHT: 260 LBS | RESPIRATION RATE: 22 BRPM | HEIGHT: 67 IN | BODY MASS INDEX: 40.81 KG/M2

## 2024-07-31 DIAGNOSIS — C61 PROSTATE CANCER (HCC): ICD-10-CM

## 2024-07-31 DIAGNOSIS — N40.1 BPH WITH OBSTRUCTION/LOWER URINARY TRACT SYMPTOMS: Primary | ICD-10-CM

## 2024-07-31 DIAGNOSIS — N13.8 BPH WITH OBSTRUCTION/LOWER URINARY TRACT SYMPTOMS: Primary | ICD-10-CM

## 2024-07-31 PROCEDURE — 1036F TOBACCO NON-USER: CPT | Performed by: UROLOGY

## 2024-07-31 PROCEDURE — G8427 DOCREV CUR MEDS BY ELIG CLIN: HCPCS | Performed by: UROLOGY

## 2024-07-31 PROCEDURE — 99214 OFFICE O/P EST MOD 30 MIN: CPT | Performed by: UROLOGY

## 2024-07-31 PROCEDURE — G8417 CALC BMI ABV UP PARAM F/U: HCPCS | Performed by: UROLOGY

## 2024-07-31 PROCEDURE — 1123F ACP DISCUSS/DSCN MKR DOCD: CPT | Performed by: UROLOGY

## 2024-07-31 RX ORDER — FERROUS SULFATE 325(65) MG
325 TABLET ORAL
COMMUNITY

## 2024-07-31 RX ORDER — SACUBITRIL AND VALSARTAN 24; 26 MG/1; MG/1
1 TABLET, FILM COATED ORAL 2 TIMES DAILY
COMMUNITY

## 2024-07-31 RX ORDER — DAPAGLIFLOZIN 10 MG/1
10 TABLET, FILM COATED ORAL EVERY MORNING
COMMUNITY

## 2024-07-31 NOTE — PROGRESS NOTES
Dr. Salomon Cook MD  [facility]  Urology Clinic      Patient:  Elliot Morgan  YOB: 1935  Date: 7/31/2024    HISTORY OF PRESENT ILLNESS:   The patient is a 89 y.o. male who presents today for follow-up for the following problem(s): Low risk prostate cancer. PSA worsened this year.     Overall the problem(s) : worsening.   Associated Symptoms: No dysuria, gross hematuria.  Pain Severity: 0/10    Summary of old records:   In 2018, he had MRI of pelvis completed for elevated PSA and showed PIRADS 3 lesion. URONAV biopsy was completed 10-8-18 by Dr. Sal, PSA 4.88 at that time. 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    -Tissue was sent for Decipher and showed patient was low risk.     Repeat MRI and biopsy in 2023 continues to show low risk Chaz 6 disease.       Imaging/Labs reviewed during today's visit:  I have independently reviewed and verified the following films during today's visit.  PSA better today.     Last several PSA's:  Lab Results   Component Value Date    PSA 7.69 (H) 07/24/2024    PSA 9.54 (H) 03/07/2024    PSA 10.54 (H) 07/13/2023       Last total testosterone:  No results found for: \"TESTOSTERONE\"    Urinalysis today:  No results found for this visit on 07/31/24.    Last BUN and creatinine:  Lab Results   Component Value Date    BUN 37 (H) 06/14/2024     Lab Results   Component Value Date    CREATININE 1.65 (H) 06/14/2024       Imaging Reviewed during this Office Visit:   (results were independently reviewed by physician and radiology report verified)    PAST MEDICAL, FAMILY AND SOCIAL HISTORY UPDATE:  Past Medical History:   Diagnosis Date    Diabetes mellitus (HCC)     Hypertension     Prostate cancer (HCC)      History reviewed. No pertinent surgical history.  History reviewed. No pertinent family history.  Outpatient Medications Marked as Taking for the 7/31/24 encounter (Office Visit)

## 2025-07-30 LAB — PROSTATE SPECIFIC ANTIGEN: 8.71 NG/ML

## 2025-08-06 ENCOUNTER — OFFICE VISIT (OUTPATIENT)
Dept: UROLOGY | Age: 89
End: 2025-08-06
Payer: MEDICARE

## 2025-08-06 VITALS
SYSTOLIC BLOOD PRESSURE: 128 MMHG | WEIGHT: 243.8 LBS | BODY MASS INDEX: 38.27 KG/M2 | HEIGHT: 67 IN | DIASTOLIC BLOOD PRESSURE: 72 MMHG

## 2025-08-06 DIAGNOSIS — C61 PROSTATE CANCER (HCC): ICD-10-CM

## 2025-08-06 DIAGNOSIS — N13.8 BPH WITH OBSTRUCTION/LOWER URINARY TRACT SYMPTOMS: Primary | ICD-10-CM

## 2025-08-06 DIAGNOSIS — N40.1 BPH WITH OBSTRUCTION/LOWER URINARY TRACT SYMPTOMS: Primary | ICD-10-CM

## 2025-08-06 PROCEDURE — 1036F TOBACCO NON-USER: CPT | Performed by: UROLOGY

## 2025-08-06 PROCEDURE — G8427 DOCREV CUR MEDS BY ELIG CLIN: HCPCS | Performed by: UROLOGY

## 2025-08-06 PROCEDURE — 1159F MED LIST DOCD IN RCRD: CPT | Performed by: UROLOGY

## 2025-08-06 PROCEDURE — 99214 OFFICE O/P EST MOD 30 MIN: CPT | Performed by: UROLOGY

## 2025-08-06 PROCEDURE — G8419 CALC BMI OUT NRM PARAM NOF/U: HCPCS | Performed by: UROLOGY

## 2025-08-06 PROCEDURE — 1123F ACP DISCUSS/DSCN MKR DOCD: CPT | Performed by: UROLOGY

## 2025-08-06 RX ORDER — GINSENG 100 MG
50 CAPSULE ORAL DAILY
COMMUNITY

## 2025-08-06 RX ORDER — MAGNESIUM OXIDE 400 MG/1
400 TABLET ORAL DAILY
COMMUNITY